# Patient Record
Sex: FEMALE | Race: WHITE | URBAN - METROPOLITAN AREA
[De-identification: names, ages, dates, MRNs, and addresses within clinical notes are randomized per-mention and may not be internally consistent; named-entity substitution may affect disease eponyms.]

---

## 2021-01-14 ENCOUNTER — TELEPHONE (OUTPATIENT)
Dept: PRIMARY CARE CLINIC | Age: 66
End: 2021-01-14

## 2021-01-14 NOTE — TELEPHONE ENCOUNTER
Called pt to change appt on 2/11/21 d/t change in Dr. Pastor Mendoza schedule, appt changed to 2/10/21 @ 10:00am, pt ok'd.      .Electronically signed by Moustapha Abraham on 1/14/21 at 3:32 PM EST